# Patient Record
Sex: FEMALE | Race: WHITE | NOT HISPANIC OR LATINO | Employment: OTHER | URBAN - METROPOLITAN AREA
[De-identification: names, ages, dates, MRNs, and addresses within clinical notes are randomized per-mention and may not be internally consistent; named-entity substitution may affect disease eponyms.]

---

## 2023-11-06 NOTE — PROGRESS NOTES
Subjective   Zina Butt  is a 73 y.o. female who presents for evaluation of lung nodule.     This patient reported to the emergency department with syncope. She received an x-ray which showed an abnormality which prompted a CT scan. A CT scan was performed on 10/11/2022 at the [facility] reported a 1.1 x 0.8 cm nodule medially within the right lower lobe. I recommended the NodifyLung blood test, which showed a risk of malignancy of 2%. Based on this I recommended radiographic surveillance, and she presents today after recent scan      Currently the patient is in their usual state of health. They deny the following symptoms: chest pain, cough, shortness of breath at rest, shortness of breath with activity, fevers, chills, weight loss, abdominal pain, difficulty swallowing, vomiting, changes to bowel function, and changes to mental status.      There have been no significant changes to their documented medical, surgical and family history.      Cancer-related family history includes Cancer in an other family member; Colon cancer in her mother; Pancreatic cancer in her sister.  Social History    Tobacco Use      Smoking status: Former        Types: Cigarettes      Smokeless tobacco: Never    Objective   Physical Exam  The patient is well-appearing and in no acute distress. The trachea is midline and there is no crepitus. The lungs were clear to auscultation grossly. There was good effort and excursion. The heart had a regular rate and rhythm. The abdomen was soft, nontender and nondistended. The extremities had no edema or gross deformities. Mood and affect are appropriate.  Diagnostic Studies  I reviewed a CT chest performed recently. I do not see any new or concerning nodules or adenopathy.  The right lower lobe lung nodule is unchanged in size    Assessment/Plan   Overall, I believe that the patient is doing well.     I believe this right lower lobe lung nodule is unlikely to represent a malignant process.   Ongoing radiographic surveillance is reasonable given the patient's formal tobacco use.    I recommend CT chest 12 months.     I discussed this in detail with the patient, including a discussion of alternatives. They were comfortable with this approach.     Jd Nelson MD  [phone]

## 2023-11-07 PROBLEM — R56.9 SEIZURE (MULTI): Status: ACTIVE | Noted: 2023-11-07

## 2023-11-07 PROBLEM — E78.5 HYPERLIPIDEMIA: Status: ACTIVE | Noted: 2023-11-07

## 2023-11-07 PROBLEM — I10 HYPERTENSION: Status: ACTIVE | Noted: 2023-11-07

## 2023-11-07 PROBLEM — R91.8 MULTIPLE LUNG NODULES ON CT: Status: ACTIVE | Noted: 2023-11-07

## 2023-11-07 PROBLEM — K21.9 GERD (GASTROESOPHAGEAL REFLUX DISEASE): Status: ACTIVE | Noted: 2023-11-07

## 2023-11-07 PROBLEM — E87.1 HYPONATREMIA: Status: ACTIVE | Noted: 2023-11-07

## 2023-11-07 RX ORDER — ATORVASTATIN CALCIUM 20 MG/1
1 TABLET, FILM COATED ORAL DAILY
COMMUNITY
Start: 2020-08-29

## 2023-11-07 RX ORDER — AMLODIPINE BESYLATE 10 MG/1
10 TABLET ORAL DAILY
COMMUNITY
End: 2023-11-08 | Stop reason: ALTCHOICE

## 2023-11-07 RX ORDER — LOSARTAN POTASSIUM 100 MG/1
1 TABLET ORAL DAILY
COMMUNITY
Start: 2020-08-24 | End: 2023-11-08 | Stop reason: ALTCHOICE

## 2023-11-07 RX ORDER — AMLODIPINE BESYLATE 5 MG/1
7.5 TABLET ORAL
COMMUNITY
Start: 2023-09-21

## 2023-11-07 RX ORDER — BUSPIRONE HYDROCHLORIDE 5 MG/1
5 TABLET ORAL 2 TIMES DAILY
COMMUNITY
Start: 2023-10-03

## 2023-11-07 RX ORDER — NAPROXEN SODIUM 220 MG/1
1 TABLET, FILM COATED ORAL DAILY
COMMUNITY
Start: 2021-07-13

## 2023-11-07 RX ORDER — PANTOPRAZOLE SODIUM 40 MG/1
1 TABLET, DELAYED RELEASE ORAL DAILY
COMMUNITY
Start: 2021-06-01 | End: 2023-11-08 | Stop reason: ALTCHOICE

## 2023-11-08 ENCOUNTER — OFFICE VISIT (OUTPATIENT)
Dept: SURGERY | Facility: CLINIC | Age: 73
End: 2023-11-08
Payer: MEDICARE

## 2023-11-08 ENCOUNTER — HOSPITAL ENCOUNTER (OUTPATIENT)
Dept: RADIOLOGY | Facility: CLINIC | Age: 73
Discharge: HOME | End: 2023-11-08
Payer: MEDICARE

## 2023-11-08 VITALS
RESPIRATION RATE: 17 BRPM | HEIGHT: 63 IN | BODY MASS INDEX: 21.65 KG/M2 | HEART RATE: 59 BPM | OXYGEN SATURATION: 100 % | DIASTOLIC BLOOD PRESSURE: 75 MMHG | SYSTOLIC BLOOD PRESSURE: 160 MMHG | WEIGHT: 122.2 LBS | TEMPERATURE: 98 F

## 2023-11-08 DIAGNOSIS — R91.8 MULTIPLE LUNG NODULES ON CT: Primary | ICD-10-CM

## 2023-11-08 DIAGNOSIS — R91.8 OTHER NONSPECIFIC ABNORMAL FINDING OF LUNG FIELD: ICD-10-CM

## 2023-11-08 DIAGNOSIS — R91.1 LUNG NODULE: Primary | ICD-10-CM

## 2023-11-08 PROCEDURE — 99214 OFFICE O/P EST MOD 30 MIN: CPT | Performed by: THORACIC SURGERY (CARDIOTHORACIC VASCULAR SURGERY)

## 2023-11-08 PROCEDURE — 3078F DIAST BP <80 MM HG: CPT | Performed by: THORACIC SURGERY (CARDIOTHORACIC VASCULAR SURGERY)

## 2023-11-08 PROCEDURE — 71250 CT THORAX DX C-: CPT

## 2023-11-08 PROCEDURE — 1159F MED LIST DOCD IN RCRD: CPT | Performed by: THORACIC SURGERY (CARDIOTHORACIC VASCULAR SURGERY)

## 2023-11-08 PROCEDURE — 71250 CT THORAX DX C-: CPT | Performed by: RADIOLOGY

## 2023-11-08 PROCEDURE — 3077F SYST BP >= 140 MM HG: CPT | Performed by: THORACIC SURGERY (CARDIOTHORACIC VASCULAR SURGERY)

## 2023-11-08 PROCEDURE — 1036F TOBACCO NON-USER: CPT | Performed by: THORACIC SURGERY (CARDIOTHORACIC VASCULAR SURGERY)

## 2023-11-08 PROCEDURE — 1126F AMNT PAIN NOTED NONE PRSNT: CPT | Performed by: THORACIC SURGERY (CARDIOTHORACIC VASCULAR SURGERY)

## 2023-11-08 SDOH — ECONOMIC STABILITY: FOOD INSECURITY: WITHIN THE PAST 12 MONTHS, THE FOOD YOU BOUGHT JUST DIDN'T LAST AND YOU DIDN'T HAVE MONEY TO GET MORE.: NEVER TRUE

## 2023-11-08 SDOH — ECONOMIC STABILITY: FOOD INSECURITY: WITHIN THE PAST 12 MONTHS, YOU WORRIED THAT YOUR FOOD WOULD RUN OUT BEFORE YOU GOT MONEY TO BUY MORE.: NEVER TRUE

## 2023-11-08 ASSESSMENT — PATIENT HEALTH QUESTIONNAIRE - PHQ9
1. LITTLE INTEREST OR PLEASURE IN DOING THINGS: NOT AT ALL
SUM OF ALL RESPONSES TO PHQ9 QUESTIONS 1 AND 2: 0
2. FEELING DOWN, DEPRESSED OR HOPELESS: NOT AT ALL

## 2023-11-08 ASSESSMENT — COLUMBIA-SUICIDE SEVERITY RATING SCALE - C-SSRS
6. HAVE YOU EVER DONE ANYTHING, STARTED TO DO ANYTHING, OR PREPARED TO DO ANYTHING TO END YOUR LIFE?: NO
2. HAVE YOU ACTUALLY HAD ANY THOUGHTS OF KILLING YOURSELF?: NO
1. IN THE PAST MONTH, HAVE YOU WISHED YOU WERE DEAD OR WISHED YOU COULD GO TO SLEEP AND NOT WAKE UP?: NO

## 2023-11-08 ASSESSMENT — ENCOUNTER SYMPTOMS
DEPRESSION: 0
OCCASIONAL FEELINGS OF UNSTEADINESS: 1
LOSS OF SENSATION IN FEET: 0

## 2023-11-08 ASSESSMENT — PAIN SCALES - GENERAL: PAINLEVEL: 0-NO PAIN

## 2024-11-04 NOTE — PROGRESS NOTES
Subjective   Zina Butt  is a 74 y.o. female who presents for evaluation of lung nodule.     This patient reported to the emergency department with syncope. She received an x-ray which showed an abnormality which prompted a CT scan. A CT scan was performed on 10/11/2022 at the [facility] reported a 1.1 x 0.8 cm nodule medially within the right lower lobe. I recommended the Nodifyng blood test, which showed a risk of malignancy of 2%. Based on this I recommended radiographic surveillance, and she presents today after recent scan. Last tobacco was 2 years after smoking for many years.     Currently the patient is presenting for routine follow-up and in their usual state of health. She reports the following symptoms: leg swelling and denies the following symptoms: chest pain, shortness of breath at rest, shortness of breath with activity, cough, hemoptysis, fevers, chills, and weight loss.      There have been no significant changes to their documented medical, surgical and family history.     She  reports that she has quit smoking. Her smoking use included cigarettes. She has never used smokeless tobacco. She reports current alcohol use. She reports that she does not use drugs.    Objective   Physical Exam  The patient is well-appearing and in no acute distress. The trachea is midline and there is no crepitus. The lungs were clear to auscultation grossly. There was good effort and excursion. The heart had a regular rate and rhythm. The abdomen was soft, nontender and nondistended. The extremities had no edema or gross deformities. Mood and affect are appropriate.  Diagnostic Studies  I reviewed a CT chest performed recently. I do not see any new or concerning nodules or adenopathy    Assessment/Plan   I believe that the patient is doing well.     Based on the patient's clinical presentation and my review of their radiographic imaging, I believe the lung nodule is unlikely to represent a malignant process.   We discussed various management strategies including surgery, biopsy, and observation.  Based on this discussion, the patient elected for observational management.  I recommend serial radiographic surveillance.    I recommend CT chest 12 months (phone call or PA)    I discussed this in detail with the patient, including a discussion of alternatives. They were comfortable with this approach.     Jd Nelson MD  [phone]

## 2024-11-06 ENCOUNTER — HOSPITAL ENCOUNTER (OUTPATIENT)
Dept: RADIOLOGY | Facility: CLINIC | Age: 74
Discharge: HOME | End: 2024-11-06
Payer: MEDICARE

## 2024-11-06 ENCOUNTER — OFFICE VISIT (OUTPATIENT)
Dept: SURGERY | Facility: CLINIC | Age: 74
End: 2024-11-06
Payer: MEDICARE

## 2024-11-06 VITALS
OXYGEN SATURATION: 99 % | HEIGHT: 63 IN | DIASTOLIC BLOOD PRESSURE: 72 MMHG | WEIGHT: 122 LBS | TEMPERATURE: 98.1 F | SYSTOLIC BLOOD PRESSURE: 145 MMHG | BODY MASS INDEX: 21.62 KG/M2 | HEART RATE: 60 BPM

## 2024-11-06 DIAGNOSIS — R91.8 MULTIPLE LUNG NODULES ON CT: Primary | ICD-10-CM

## 2024-11-06 DIAGNOSIS — R91.1 LUNG NODULE: ICD-10-CM

## 2024-11-06 PROCEDURE — 1126F AMNT PAIN NOTED NONE PRSNT: CPT | Performed by: THORACIC SURGERY (CARDIOTHORACIC VASCULAR SURGERY)

## 2024-11-06 PROCEDURE — 71250 CT THORAX DX C-: CPT

## 2024-11-06 PROCEDURE — 3077F SYST BP >= 140 MM HG: CPT | Performed by: THORACIC SURGERY (CARDIOTHORACIC VASCULAR SURGERY)

## 2024-11-06 PROCEDURE — 1159F MED LIST DOCD IN RCRD: CPT | Performed by: THORACIC SURGERY (CARDIOTHORACIC VASCULAR SURGERY)

## 2024-11-06 PROCEDURE — 99214 OFFICE O/P EST MOD 30 MIN: CPT | Performed by: THORACIC SURGERY (CARDIOTHORACIC VASCULAR SURGERY)

## 2024-11-06 PROCEDURE — 3078F DIAST BP <80 MM HG: CPT | Performed by: THORACIC SURGERY (CARDIOTHORACIC VASCULAR SURGERY)

## 2024-11-06 PROCEDURE — 1157F ADVNC CARE PLAN IN RCRD: CPT | Performed by: THORACIC SURGERY (CARDIOTHORACIC VASCULAR SURGERY)

## 2024-11-06 PROCEDURE — 1036F TOBACCO NON-USER: CPT | Performed by: THORACIC SURGERY (CARDIOTHORACIC VASCULAR SURGERY)

## 2024-11-06 SDOH — ECONOMIC STABILITY: FOOD INSECURITY: WITHIN THE PAST 12 MONTHS, THE FOOD YOU BOUGHT JUST DIDN'T LAST AND YOU DIDN'T HAVE MONEY TO GET MORE.: NEVER TRUE

## 2024-11-06 SDOH — ECONOMIC STABILITY: FOOD INSECURITY: WITHIN THE PAST 12 MONTHS, YOU WORRIED THAT YOUR FOOD WOULD RUN OUT BEFORE YOU GOT MONEY TO BUY MORE.: NEVER TRUE

## 2024-11-06 ASSESSMENT — ENCOUNTER SYMPTOMS
OCCASIONAL FEELINGS OF UNSTEADINESS: 0
LOSS OF SENSATION IN FEET: 0
DEPRESSION: 0

## 2024-11-06 ASSESSMENT — COLUMBIA-SUICIDE SEVERITY RATING SCALE - C-SSRS
6. HAVE YOU EVER DONE ANYTHING, STARTED TO DO ANYTHING, OR PREPARED TO DO ANYTHING TO END YOUR LIFE?: NO
1. IN THE PAST MONTH, HAVE YOU WISHED YOU WERE DEAD OR WISHED YOU COULD GO TO SLEEP AND NOT WAKE UP?: NO
2. HAVE YOU ACTUALLY HAD ANY THOUGHTS OF KILLING YOURSELF?: NO

## 2024-11-06 ASSESSMENT — LIFESTYLE VARIABLES
AUDIT-C TOTAL SCORE: 1
HOW MANY STANDARD DRINKS CONTAINING ALCOHOL DO YOU HAVE ON A TYPICAL DAY: 1 OR 2
HOW OFTEN DO YOU HAVE A DRINK CONTAINING ALCOHOL: MONTHLY OR LESS
SKIP TO QUESTIONS 9-10: 1
HOW OFTEN DO YOU HAVE SIX OR MORE DRINKS ON ONE OCCASION: NEVER

## 2024-11-06 ASSESSMENT — PATIENT HEALTH QUESTIONNAIRE - PHQ9
1. LITTLE INTEREST OR PLEASURE IN DOING THINGS: NOT AT ALL
2. FEELING DOWN, DEPRESSED OR HOPELESS: NOT AT ALL
SUM OF ALL RESPONSES TO PHQ9 QUESTIONS 1 AND 2: 0

## 2024-11-06 ASSESSMENT — PAIN SCALES - GENERAL: PAINLEVEL_OUTOF10: 0-NO PAIN

## 2024-11-07 DIAGNOSIS — R91.1 LUNG NODULE: ICD-10-CM

## 2025-11-07 ENCOUNTER — APPOINTMENT (OUTPATIENT)
Dept: RADIOLOGY | Facility: CLINIC | Age: 75
End: 2025-11-07
Payer: MEDICARE